# Patient Record
Sex: MALE | Race: AMERICAN INDIAN OR ALASKA NATIVE
[De-identification: names, ages, dates, MRNs, and addresses within clinical notes are randomized per-mention and may not be internally consistent; named-entity substitution may affect disease eponyms.]

---
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
## 2019-07-16 ENCOUNTER — HOSPITAL ENCOUNTER (EMERGENCY)
Dept: HOSPITAL 5 - ED | Age: 19
Discharge: HOME | End: 2019-07-16
Payer: MEDICAID

## 2019-07-16 VITALS — SYSTOLIC BLOOD PRESSURE: 136 MMHG | DIASTOLIC BLOOD PRESSURE: 76 MMHG

## 2019-07-16 DIAGNOSIS — Z11.3: Primary | ICD-10-CM

## 2019-07-16 LAB
BILIRUB UR QL STRIP: (no result)
BLOOD UR QL VISUAL: (no result)
MUCOUS THREADS #/AREA URNS HPF: (no result) /HPF
PH UR STRIP: 5 [PH] (ref 5–7)
PROT UR STRIP-MCNC: (no result) MG/DL
RBC #/AREA URNS HPF: 7 /HPF (ref 0–6)
UROBILINOGEN UR-MCNC: 2 MG/DL (ref ?–2)
WBC #/AREA URNS HPF: 4 /HPF (ref 0–6)

## 2019-07-16 PROCEDURE — 81001 URINALYSIS AUTO W/SCOPE: CPT

## 2019-07-16 PROCEDURE — 87591 N.GONORRHOEAE DNA AMP PROB: CPT

## 2019-07-16 NOTE — EVENT NOTE
ED Screening Note


ED Screening Note: 


bump on penis





[facility] 


none








This initial assessment/diagnostic orders/clinical plan/treatment(s) is/are 

subject to change based on patients health status, clinical progression and re-

assessment by fellow clinical providers in the ED. Further treatment and workup 

at subsequent clinical providers discretion. Patient/guardian urged not to elope

from the ED as their condition may be serious if not clinically assessed and 

managed. 





Initial orders include: 


std eval

## 2019-07-16 NOTE — EMERGENCY DEPARTMENT REPORT
Chief Complaint: Urogenital-Male


Stated Complaint: STD CHECK


Time Seen by Provider: 07/16/19 14:29





- HPI


History of Present Illness: 





This is a 18-year-old male presents to ED wanting an STD check.  Patient denies 

any fever, chills, nausea vomiting, dysuria, penile discharge or testicular 

swelling or pain.





- ROS


Review of Systems: 


As noted in HPI








- Exam


Vital Signs: 


                                   Vital Signs











  07/16/19





  14:51


 


Temperature 98.0 F


 


Pulse Rate 75


 


Respiratory 16





Rate 


 


Blood Pressure 136/76


 


O2 Sat by Pulse 100





Oximetry 











Physical Exam: 


General: Alert and oriented 3


Skin: Warm dry no rash no lesions





MSE screening note: 


Focused history and physical exam performed.


Due to findings the following was ordered:











ED Medical Decision Making





- Medical Decision Making





18-year-old male presents for STD screening.


I discussed the patient at this is not a medical emergency and he will need to 

follow up with the primary care physician most outside medical clinic for STD 

screening.


 urinalysis was ordered at triage, urinalysis negative.  Discussed this findings

with the patient.


Chlamydia cultures were sent as well.


Initial instructions vital signs are normal





ED Disposition for MSE


Clinical Impression: 


 Screening for STD (sexually transmitted disease)





Disposition: DC-01 TO HOME OR SELFCARE


Is pt being admited?: No


Does the pt Need Aspirin: No


Condition: Stable


Instructions:  Sexually Transmitted Diseases (ED)


Referrals: 


PASQUALE ELENA MD [Primary Care Provider] - 3-5 Days


[facility] [Outside] - 3-5 Days


[facility] [Outside] - 3-5 Days


Forms:  Work/School Release Form(ED)


Time of Disposition: 16:56

## 2021-11-17 ENCOUNTER — HOSPITAL ENCOUNTER (EMERGENCY)
Dept: HOSPITAL 5 - ED | Age: 21
Discharge: HOME | End: 2021-11-17
Payer: COMMERCIAL

## 2021-11-17 VITALS — DIASTOLIC BLOOD PRESSURE: 79 MMHG | SYSTOLIC BLOOD PRESSURE: 129 MMHG
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
## 2021-11-17 DIAGNOSIS — S60.552A: Primary | ICD-10-CM

## 2021-11-17 DIAGNOSIS — Z79.899: ICD-10-CM
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
## 2021-11-17 DIAGNOSIS — F12.90: ICD-10-CM
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
## 2021-11-17 PROCEDURE — 99282 EMERGENCY DEPT VISIT SF MDM: CPT

## 2021-11-17 NOTE — EMERGENCY DEPARTMENT REPORT
ED Upper Extremity Inj HPI





- General


Chief Complaint: Skin/Abscess/Foreign Body


Stated Complaint: UNK PIECE STUCK IN HAND


Source: patient


Mode of arrival: Ambulatory


Limitations: No Limitations





- History of Present Illness


Initial Comments: 





21-year-old  was working on the job with the appropriate safety gear 

including gloves when he was rubbing his hand across the sink ceiling felt a 

sharp object into the webbing of his left hand between the first and second 

phalanges palmar aspect which he was unsuccessful on removal with removing while

on the job and presents emerge department seeking evaluation and removal of the 

foreign object.  Reports a dull throbbing pain to the area with palpation.  No 

fever chills or sweats.  Reports no redness no swelling no wound discharge.  No 

reported immunocompromising conditions.


Handedness: left


Place: home


Improves With: none


Worsens With: none


Context: injury


Associated Symptoms: denies other symptoms





- Related Data


                                  Previous Rx's











 Medication  Instructions  Recorded  Last Taken  Type


 


Cyclobenzaprine [Flexeril 10 MG 10 mg PO Q8H PRN #12 tablet 09/07/15 Unknown Rx





TAB]    


 


Ibuprofen [Motrin 800 MG tab] 800 mg PO Q8HR PRN #30 tablet 09/07/15 Unknown Rx


 


Chlorhexidine Gluconate [Hibiclens] 5 ml TP BID #240 liquid 11/17/21 Unknown Rx


 


Ketorolac [Toradol] 10 mg PO Q6H PRN #15 tablet 11/17/21 Unknown Rx


 


cephALEXin [Keflex] 500 mg PO Q8HR #21 capsule 11/17/21 Unknown Rx











                                    Allergies











Allergy/AdvReac Type Severity Reaction Status Date / Time


 


No Known Allergies Allergy   Verified 11/17/21 15:56














ED Review of Systems


ROS: 


Stated complaint: UNK PIECE STUCK IN HAND


Other details as noted in HPI





Comment: All other systems reviewed and negative





ED Past Medical Hx





- Social History


Smoking Status: Never Smoker


Substance Use Type: Marijuana





- Medications


Home Medications: 


                                Home Medications











 Medication  Instructions  Recorded  Confirmed  Last Taken  Type


 


Cyclobenzaprine [Flexeril 10 MG 10 mg PO Q8H PRN #12 tablet 09/07/15  Unknown Rx





TAB]     


 


Ibuprofen [Motrin 800 MG tab] 800 mg PO Q8HR PRN #30 tablet 09/07/15  Unknown Rx


 


Chlorhexidine Gluconate [Hibiclens] 5 ml TP BID #240 liquid 11/17/21  Unknown Rx


 


Ketorolac [Toradol] 10 mg PO Q6H PRN #15 tablet 11/17/21  Unknown Rx


 


cephALEXin [Keflex] 500 mg PO Q8HR #21 capsule 11/17/21  Unknown Rx














ED Physical Exam





- General


Limitations: No Limitations


General appearance: alert, in no apparent distress





- Head


Head exam: Present: atraumatic, normocephalic





- Eye


Eye exam: Present: normal appearance





- ENT


ENT exam: Present: mucous membranes moist





- Neck


Neck exam: Present: normal inspection





- Respiratory


Respiratory exam: Present: normal lung sounds bilaterally.  Absent: respiratory 

distress





- Cardiovascular


Cardiovascular Exam: Present: regular rate, normal rhythm.  Absent: systolic 

murmur, diastolic murmur, rubs, gallop





- GI/Abdominal


GI/Abdominal exam: Present: soft, normal bowel sounds





- Rectal


Rectal exam: Present: deferred





- Extremities Exam


Extremities exam: Present: normal inspection





- Back Exam


Back exam: Present: normal inspection





- Neurological Exam


Neurological exam: Present: alert, oriented X3, CN II-XII intact





- Psychiatric


Psychiatric exam: Present: normal affect, normal mood





- Skin


Skin exam: Present: warm, dry, intact, normal color, other.  Absent: rash





ED Course





                                   Vital Signs











  11/17/21





  15:56


 


Temperature 98 F


 


Pulse Rate 75


 


Respiratory 18





Rate 


 


Blood Pressure 129/79





[Left] 


 


O2 Sat by Pulse 98





Oximetry 











Critical care attestation.: 


If time is entered above; I have spent that time in minutes in the direct care 

of this critically ill patient, excluding procedure time.








ED Disposition


Clinical Impression: 


 Foreign body of hand, left





Disposition: 01 HOME / SELF CARE / HOMELESS


Is pt being admited?: No


Does the pt Need Aspirin: No


Condition: Stable


Instructions:  Sliver Removal, Care After, Skin Foreign Body

## 2021-11-21 ENCOUNTER — HOSPITAL ENCOUNTER (EMERGENCY)
Dept: HOSPITAL 5 - ED | Age: 21
Discharge: HOME | End: 2021-11-21
Payer: COMMERCIAL

## 2021-11-21 VITALS — DIASTOLIC BLOOD PRESSURE: 77 MMHG | SYSTOLIC BLOOD PRESSURE: 119 MMHG

## 2021-11-21 DIAGNOSIS — Z48.02: Primary | ICD-10-CM

## 2021-11-21 NOTE — EMERGENCY DEPARTMENT REPORT
Suture/Staple Removal





- [facility]


Chief Complaint: Laceration/Recheck/Suture


Stated Complaint: SUTURE REMOVAL


Time Seen by Provider: 11/21/21 16:22


Wound Location: left hand





ED Review of Systems


ROS: 


Stated complaint: SUTURE REMOVAL


Other details as noted in HPI





Comment: All other systems reviewed and negative


Constitutional: denies: chills, fever


Eyes: denies: eye pain, eye discharge, vision change


ENT: denies: ear pain, throat pain


Respiratory: denies: cough, shortness of breath, wheezing


Cardiovascular: denies: chest pain, palpitations


Endocrine: no symptoms reported


Gastrointestinal: denies: abdominal pain, nausea, diarrhea


Genitourinary: denies: urgency, dysuria


Musculoskeletal: denies: back pain, joint swelling, arthralgia


Skin: denies: rash, lesions


Neurological: denies: headache, weakness, paresthesias


Psychiatric: denies: anxiety, depression


Hematological/Lymphatic: denies: easy bleeding, easy bruising





ED Past Medical Hx





- Social History


Smoking Status: Never Smoker


Substance Use Type: Marijuana





- Medications


Home Medications: 


                                Home Medications











 Medication  Instructions  Recorded  Confirmed  Last Taken  Type


 


Cyclobenzaprine [Flexeril 10 MG 10 mg PO Q8H PRN #12 tablet 09/07/15  Unknown Rx





TAB]     


 


Ibuprofen [Motrin 800 MG tab] 800 mg PO Q8HR PRN #30 tablet 09/07/15  Unknown Rx


 


Chlorhexidine Gluconate [Hibiclens] 5 ml TP BID #240 liquid 11/17/21  Unknown Rx


 


Ketorolac [Toradol] 10 mg PO Q6H PRN #15 tablet 11/17/21  Unknown Rx


 


cephALEXin [Keflex] 500 mg PO Q8HR #21 capsule 11/17/21  Unknown Rx














Suture Removal Exam





- Exam


General: 


Vital signs noted. No distress. Alert and acting appropriately.





Wound: No Pathologic Erythema, No Tenderness, No Drainage, No Pus, No Wound 

Dehiscence


Other Systems: 


All other systems reviewed and are unremarkable.








ED Course


                                   Vital Signs











  11/21/21





  16:21


 


Temperature 99 F


 


Pulse Rate 74


 


Respiratory 20





Rate 


 


Blood Pressure 119/77





[Right] 


 


O2 Sat by Pulse 99





Oximetry 














- Reevaluation(s)


Reevaluation #1: 





11/21/21 16:26


Patient is speaking in full sentences with no signs of distress noted.





ED Recheck MDM





- Medical Decision Making





This is a 21-year-old male that presents with suture removal.  She is stable and

 was examined by me.  Total of 1 suture has been removed and patient tolerated 

well.  No signs of wound dehiscence, drainage, or cellulitis.  Patient was 

referred to Follow-up with a primary care doctor in 3-5 days or if symptoms 

worsen and continue return to emergency room as soon as possible.  At time of 

discharge, the patient does not seem toxic or ill in appearance.  No acute signs

 of distress noted.  Patient agrees to discharge treatment plan of care.  No fu

rther questions noted by the patient.


Critical care attestation.: 


If time is entered above; I have spent that time in minutes in the direct care 

of this critically ill patient, excluding procedure time.








ED Disposition


Clinical Impression: 


 Encounter for removal of sutures





Disposition: 01 HOME / SELF CARE / HOMELESS


Is pt being admited?: No


Does the pt Need Aspirin: No


Condition: Stable


Additional Instructions: 


Follow-up with a primary care and orthopedic doctor in 3-5 days or if symptoms 

worsen and continue return to emergency room as soon as possible. 


Referrals: 


PRIMARY CARE,MD [Referring] - 3-5 Days


PASQUALE ELENA MD [Staff Physician] - 3-5 Days


EVAN CAMPOVERDE MD [Staff Physician] - 3-5 Days


Time of Disposition: 16:27

## 2022-03-23 ENCOUNTER — HOSPITAL ENCOUNTER (EMERGENCY)
Dept: HOSPITAL 5 - ED | Age: 22
Discharge: PSYCHIATRIC OR SUBSTANCE USE FACILITY | End: 2022-03-23
Payer: COMMERCIAL

## 2022-03-23 VITALS — DIASTOLIC BLOOD PRESSURE: 98 MMHG | SYSTOLIC BLOOD PRESSURE: 153 MMHG

## 2022-03-23 DIAGNOSIS — F29: Primary | ICD-10-CM

## 2022-03-23 DIAGNOSIS — F12.90: ICD-10-CM

## 2022-03-23 LAB
ALBUMIN SERPL-MCNC: 4.8 G/DL (ref 3.9–5)
ALT SERPL-CCNC: 15 UNITS/L (ref 7–56)
BASOPHILS # (AUTO): 0.1 K/MM3 (ref 0–0.1)
BASOPHILS NFR BLD AUTO: 0.6 % (ref 0–1.8)
BUN SERPL-MCNC: 18 MG/DL (ref 9–20)
BUN/CREAT SERPL: 15 %
CALCIUM SERPL-MCNC: 9.9 MG/DL (ref 8.4–10.2)
EOSINOPHIL # BLD AUTO: 0 K/MM3 (ref 0–0.4)
EOSINOPHIL NFR BLD AUTO: 0.4 % (ref 0–4.3)
HCT VFR BLD CALC: 51.9 % (ref 35.5–45.6)
HEMOLYSIS INDEX: 15
HGB BLD-MCNC: 17.4 GM/DL (ref 11.8–15.2)
HYALINE CASTS #/AREA URNS LPF: 4 /LPF
LYMPHOCYTES # BLD AUTO: 1.8 K/MM3 (ref 1.2–5.4)
LYMPHOCYTES NFR BLD AUTO: 18.6 % (ref 13.4–35)
MCHC RBC AUTO-ENTMCNC: 33 % (ref 32–34)
MCV RBC AUTO: 99 FL (ref 84–94)
MONOCYTES # (AUTO): 0.9 K/MM3 (ref 0–0.8)
MONOCYTES % (AUTO): 9.5 % (ref 0–7.3)
MUCOUS THREADS #/AREA URNS HPF: (no result) /HPF
PH UR STRIP: 6 [PH] (ref 5–7)
PLATELET # BLD: 202 K/MM3 (ref 140–440)
RBC # BLD AUTO: 5.27 M/MM3 (ref 3.65–5.03)
RBC #/AREA URNS HPF: 1 /HPF (ref 0–6)
T4 FREE SERPL-MCNC: 1.6 NG/DL (ref 0.76–1.46)
UROBILINOGEN UR-MCNC: < 2 MG/DL (ref ?–2)
WBC #/AREA URNS HPF: 8 /HPF (ref 0–6)

## 2022-03-23 PROCEDURE — 81001 URINALYSIS AUTO W/SCOPE: CPT

## 2022-03-23 PROCEDURE — 80320 DRUG SCREEN QUANTALCOHOLS: CPT

## 2022-03-23 PROCEDURE — 70450 CT HEAD/BRAIN W/O DYE: CPT

## 2022-03-23 PROCEDURE — 84439 ASSAY OF FREE THYROXINE: CPT

## 2022-03-23 PROCEDURE — G0480 DRUG TEST DEF 1-7 CLASSES: HCPCS

## 2022-03-23 PROCEDURE — 84443 ASSAY THYROID STIM HORMONE: CPT

## 2022-03-23 PROCEDURE — 80307 DRUG TEST PRSMV CHEM ANLYZR: CPT

## 2022-03-23 PROCEDURE — 96372 THER/PROPH/DIAG INJ SC/IM: CPT

## 2022-03-23 PROCEDURE — 36415 COLL VENOUS BLD VENIPUNCTURE: CPT

## 2022-03-23 PROCEDURE — 82550 ASSAY OF CK (CPK): CPT

## 2022-03-23 PROCEDURE — 85025 COMPLETE CBC W/AUTO DIFF WBC: CPT

## 2022-03-23 PROCEDURE — 80053 COMPREHEN METABOLIC PANEL: CPT

## 2022-03-23 PROCEDURE — 99285 EMERGENCY DEPT VISIT HI MDM: CPT

## 2022-03-23 NOTE — CAT SCAN REPORT
CT HEAD WITHOUT CONTRAST



INDICATION / CLINICAL INFORMATION: Altered Mental Status.



TECHNIQUE: All CT scans at this location are performed using CT dose reduction for ALARA by means of 
automated exposure control. 



COMPARISON: None available.



FINDINGS:



There is mild motion artifact.



HEMORRHAGE: None.

EXTRA-AXIAL SPACES: Normal in size and morphology for the patient's age.

VENTRICULAR SYSTEM: Normal in size and morphology for the patient's age.

CEREBRAL PARENCHYMA: No significant abnormality. No acute territorial infarct. 

MIDLINE SHIFT / HERNIATION: None.

CEREBELLUM / BRAINSTEM: No significant abnormality.



ORBITS: Normal as visualized.

SOFT TISSUES: No significant abnormality.

SKULL: No significant abnormality.

PARANASAL SINUSES / MASTOID AIR CELLS: Normal as visualized.



ADDITIONAL FINDINGS: None.



IMPRESSION: No acute intracranial abnormality.



Signer Name: Bayron Holcomb MD 

Signed: 3/23/2022 5:47 AM

Workstation Name: [id number]

## 2022-03-23 NOTE — EMERGENCY DEPARTMENT REPORT
Blank Doc





- Documentation


Documentation: 





Family(sister) called concerned because they have not heard from patient and t

hey did not understand why he was being transferred to psychiatric hospital.  I 

clarify that patient was medically cleared and is going to a psychiatric 

hospital where his mental health can be further treated and stabilized. She 

voiced understanding.  She states that mother has already received name of 

receiving facility.  They were encouraged to reach out to the next facility 

after transfer for further information regarding status and treatment

## 2022-03-23 NOTE — CONSULTATION
History of Present Illness





- Reason for Consult


Consult date: 03/23/22


Reason for consult: Mental health evaluation





- History of Present Psychiatric Illness


ED Note: The patient is a 21-year-old male present with a chief complaint of 

altered mental status.  The patient's father states that the patient was found 

in the neighborhood walking up and down the street behaving in a bizarre 

fashion.  Patient is hyperverbal and speaking nonsensically at times.  The 

father states approximately 2 days ago patient began speaking in a paranoid 

fashion.  Patient does not have a psych history.  Family reports they believe 

the patient smokes marijuana.  Patient is unable to tell me the current year.





The patient was seen this morning. He is confused and presents with disorganized

thinking. He is tangential " I know what real love feel like. "

















The patient was seen this morning. 


Diagnoses: 


PAST MEDICAL HISTORY: unknown





Family Psychiatric History: None reported or documented





SOCIAL HISTORY








REVIEW OF SYSTEMS





MENTAL STATUS EXAMINATION








 Assessment and Plan 


(1)Unspecified mood disorder


Current Visit: Yes   Status: Acute


1013


Treatment Plan





Zyprexa 5mg po BID








The patient needs to follow up with his outpatient psychiatrist and therapist. 


Continue home meds


Disposition:   Recommend psychiatric inpatient admission at this time. 


Will follow. Thanks


Case staffed with Dr. Bourgeois








Medications and Allergies








Medications and Allergies


                                    Allergies











Allergy/AdvReac Type Severity Reaction Status Date / Time


 


No Known Allergies Allergy   Verified 11/17/21 15:56











                                Home Medications











 Medication  Instructions  Recorded  Confirmed  Last Taken  Type


 


Cyclobenzaprine [Flexeril 10 MG 10 mg PO Q8H PRN #12 tablet 09/07/15  Unknown Rx





TAB]     


 


Ibuprofen [Motrin 800 MG tab] 800 mg PO Q8HR PRN #30 tablet 09/07/15  Unknown Rx


 


Chlorhexidine Gluconate [Hibiclens] 5 ml TP BID #240 liquid 11/17/21  Unknown Rx


 


Ketorolac [Toradol] 10 mg PO Q6H PRN #15 tablet 11/17/21  Unknown Rx


 


cephALEXin [Keflex] 500 mg PO Q8HR #21 capsule 11/17/21  Unknown Rx











Active Meds: 


Active Medications





Diphenhydramine HCl (Diphenhydramine 50 Mg/Ml Vial)  50 mg IM Q6H PRN


   PRN Reason: Agitation


   Last Admin: 03/23/22 02:29 Dose:  50 mg


   


Haloperidol Lactate (Haloperidol Lactate 5 Mg/1 Ml Inj)  10 mg IM Q8H PRN


   PRN Reason: Agitation


   Last Admin: 03/23/22 02:29 Dose:  10 mg


   











Mental Status Exam





- Vital signs


                                Last Vital Signs











Temp  97.5 F L  03/23/22 01:44


 


Pulse  147 H  03/23/22 01:44


 


Resp  20   03/23/22 01:44


 


BP  169/92   03/23/22 01:44


 


Pulse Ox  97   03/23/22 01:44














Results


Result Diagrams: 


                                 03/23/22 02:21





                                 03/23/22 02:21


                              Abnormal lab results











  03/23/22 03/23/22 03/23/22 Range/Units





  02:21 02:21 02:21 


 


RBC  5.27 H    (3.65-5.03)  M/mm3


 


Hgb  17.4 H    (11.8-15.2)  gm/dl


 


Hct  51.9 H    (35.5-45.6)  %


 


MCV  99 H    (84-94)  fl


 


MCH  33 H    (28-32)  pg


 


Mono % (Auto)  9.5 H    (0.0-7.3)  %


 


Mono # (Auto)  0.9 H    (0.0-0.8)  K/mm3


 


Seg Neutrophils %  70.9 H    (40.0-70.0)  %


 


Potassium     (3.6-5.0)  mmol/L


 


Carbon Dioxide     (22-30)  mmol/L


 


Glucose     ()  mg/dL


 


Total Creatine Kinase     ()  units/L


 


Free T4   1.60 H   (0.76-1.46)  ng/dL


 


Salicylates    < 0.3 L  (2.8-20.0)  mg/dL


 


Acetaminophen     (10.0-30.0)  ug/mL














  03/23/22 03/23/22 03/23/22 Range/Units





  02:21 02:21 02:21 


 


RBC     (3.65-5.03)  M/mm3


 


Hgb     (11.8-15.2)  gm/dl


 


Hct     (35.5-45.6)  %


 


MCV     (84-94)  fl


 


MCH     (28-32)  pg


 


Mono % (Auto)     (0.0-7.3)  %


 


Mono # (Auto)     (0.0-0.8)  K/mm3


 


Seg Neutrophils %     (40.0-70.0)  %


 


Potassium   3.5 L   (3.6-5.0)  mmol/L


 


Carbon Dioxide   20 L   (22-30)  mmol/L


 


Glucose   139 H   ()  mg/dL


 


Total Creatine Kinase    369 H  ()  units/L


 


Free T4     (0.76-1.46)  ng/dL


 


Salicylates     (2.8-20.0)  mg/dL


 


Acetaminophen  5.0 L    (10.0-30.0)  ug/mL








All other labs normal.

## 2022-03-23 NOTE — EMERGENCY DEPARTMENT REPORT
HPI





- General


Chief Complaint: Psych


Time Seen by Provider: 22 02:11





- HPI


HPI: 





Charge nurse triage/[facility]








The patient is a 21-year-old male present with a chief complaint of altered 

mental status.  The patient's father states that the patient was found in the 

neighborhood walking up and down the street behaving in a bizarre fashion.  

Patient is hyperverbal and speaking nonsensically at times.  The father states 

approximately 2 days ago patient began speaking in a paranoid fashion.  Patient 

does not have a psych history.  Family reports they believe the patient smokes 

marijuana.  Patient is unable to tell me the current year





ED Past Medical Hx





- Past Medical History


Previous Medical History?: No





- Surgical History


Past Surgical History?: No





- Family History


Family history: no significant





- Social History


Smoking Status: Never Smoker


Substance Use Type: Marijuana





- Medications


Home Medications: 


                                Home Medications











 Medication  Instructions  Recorded  Confirmed  Last Taken  Type


 


Cyclobenzaprine [Flexeril 10 MG 10 mg PO Q8H PRN #12 tablet 09/07/15  Unknown Rx





TAB]     


 


Ibuprofen [Motrin 800 MG tab] 800 mg PO Q8HR PRN #30 tablet 09/07/15  Unknown Rx


 


Chlorhexidine Gluconate [Hibiclens] 5 ml TP BID #240 liquid 21  Unknown Rx


 


Ketorolac [Toradol] 10 mg PO Q6H PRN #15 tablet 21  Unknown Rx


 


cephALEXin [Keflex] 500 mg PO Q8HR #21 capsule 21  Unknown Rx














ED Review of Systems


ROS: 


Stated complaint: CHECK OUT RT ARM


Other details as noted in HPI





Comment: Unobtainable due to pts medical conditions





Physical Exam





- Physical Exam


Vital Signs: 


                                   Vital Signs











  22





  01:44


 


Temperature 97.5 F L


 


Pulse Rate 147 H


 


Respiratory 20





Rate 


 


Blood Pressure 169/92


 


O2 Sat by Pulse 97





Oximetry 











Physical Exam: 





GENERAL: The patient is well-developed well-nourished male standing in room 

appearing anxious and hyperverbal. []


HEENT: Normocephalic.  Atraumatic.  Extraocular motions are intact.  Patient has

 moist mucous membranes.


NECK: Supple.  Trachea midline


CHEST/LUNGS:  There is no respiratory distress noted.


HEART/CARDIOVASCULAR: Regular.  There is tachycardia. 


ABDOMEN:   There is no abdominal distention.


SKIN: There is no rash.  There is no edema.  There is no diaphoresis.


NEURO: The patient is awake and alert but not oriented to place or time.  The 

patient is not cooperative with neurologic exam.  The patient moves all 

extremities well.  The patient has normal speech and gait.  GCS 15


MUSCULOSKELETAL: There is no evidence of acute injury.





ED Course


                                   Vital Signs











  22





  01:44


 


Temperature 97.5 F L


 


Pulse Rate 147 H


 


Respiratory 20





Rate 


 


Blood Pressure 169/92


 


O2 Sat by Pulse 97





Oximetry 














ED Medical Decision Making





- Lab Data


Result diagrams: 


                                 22 02:21





                                 22 02:21





- Radiology Data


Radiology results: report reviewed (CT head), image reviewed (CT head)





[facility] [address] [place] [address] Cat

 Scan Report Signed Patient: JOHANA CADET MR#: [id number] : 2000 

Acct:[id number] Age/Sex: 21 / M ADM Date: 22 Loc: ED Attending Dr: 

Ordering Physician: IVANA LUO MD Date of Service: 22 Procedure(s): CT 

head/brain wo con Accession Number(s): [id number] cc: IVANA LUO MD CT HEAD 

WITHOUT CONTRAST INDICATION / CLINICAL INFORMATION: Altered Mental Status. 

TECHNIQUE: All CT scans at this location are performed using CT dose reduction 

for ALARA by means of automated exposure control. COMPARISON: None available. 

FINDINGS: There is mild motion artifact. HEMORRHAGE: None. EXTRA-AXIAL SPACES: 

Normal in size and morphology for the patient's age. VENTRICULAR SYSTEM: Normal 

in size and morphology for the patient's age. CEREBRAL PARENCHYMA: No 

significant abnormality. No acute territorial infarct. MIDLINE SHIFT / HER

NIATION: None. CEREBELLUM / BRAINSTEM: No significant abnormality. ORBITS: 

Normal as visualized. SOFT TISSUES: No significant abnormality. SKULL: No 

significant abnormality. PARANASAL SINUSES / MASTOID AIR CELLS: Normal as 

visualized. ADDITIONAL FINDINGS: None. IMPRESSION: No acute intracranial abno

rmality. Signer Name: Bayron Holcomb MD Signed: 3/23/2022 5:47 AM Workstation 

Name: [id number] Transcribed By: RT Dictated By: Bayron Holcomb MD 

Electronically Authenticated By: Bayron Holcomb MD Signed Date/Time: 22 DD/DT: 22 TD/TT:





- Differential Diagnosis


Substance abuse, psychosis, intracranial hemorrhage


Critical care attestation.: 


If time is entered above; I have spent that time in minutes in the direct care 

of this critically ill patient, excluding procedure time.








ED Disposition


Clinical Impression: 


 Psychosis





Disposition: [address] [place] [address]


Is pt being admited?: No


Does the pt Need Aspirin: No


Condition: Stable


Referrals: 


PASQUALE ELENA MD [Primary Care Provider] - 3-5 Days